# Patient Record
Sex: FEMALE | Race: BLACK OR AFRICAN AMERICAN | Employment: UNEMPLOYED | URBAN - METROPOLITAN AREA
[De-identification: names, ages, dates, MRNs, and addresses within clinical notes are randomized per-mention and may not be internally consistent; named-entity substitution may affect disease eponyms.]

---
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
## 2021-07-16 ENCOUNTER — APPOINTMENT (OUTPATIENT)
Dept: GENERAL RADIOLOGY | Age: 21
End: 2021-07-16
Attending: EMERGENCY MEDICINE
Payer: MEDICAID

## 2021-07-16 ENCOUNTER — HOSPITAL ENCOUNTER (EMERGENCY)
Age: 21
Discharge: HOME | End: 2021-07-16
Attending: EMERGENCY MEDICINE
Payer: MEDICAID

## 2021-07-16 VITALS
TEMPERATURE: 98.7 F | HEART RATE: 87 BPM | SYSTOLIC BLOOD PRESSURE: 113 MMHG | OXYGEN SATURATION: 100 % | DIASTOLIC BLOOD PRESSURE: 63 MMHG | RESPIRATION RATE: 18 BRPM

## 2021-07-16 DIAGNOSIS — Z20.822 SUSPECTED COVID-19 VIRUS INFECTION: ICD-10-CM

## 2021-07-16 DIAGNOSIS — R05.9 COUGH: Primary | ICD-10-CM

## 2021-07-16 DIAGNOSIS — Z20.822 CLOSE EXPOSURE TO COVID-19 VIRUS: ICD-10-CM

## 2021-07-16 LAB — SARS-COV-2, COV2: NORMAL

## 2021-07-16 PROCEDURE — 99283 EMERGENCY DEPT VISIT LOW MDM: CPT

## 2021-07-16 PROCEDURE — 74011250637 HC RX REV CODE- 250/637: Performed by: EMERGENCY MEDICINE

## 2021-07-16 PROCEDURE — U0003 INFECTIOUS AGENT DETECTION BY NUCLEIC ACID (DNA OR RNA); SEVERE ACUTE RESPIRATORY SYNDROME CORONAVIRUS 2 (SARS-COV-2) (CORONAVIRUS DISEASE [COVID-19]), AMPLIFIED PROBE TECHNIQUE, MAKING USE OF HIGH THROUGHPUT TECHNOLOGIES AS DESCRIBED BY CMS-2020-01-R: HCPCS

## 2021-07-16 PROCEDURE — 71045 X-RAY EXAM CHEST 1 VIEW: CPT

## 2021-07-16 RX ORDER — ONDANSETRON 4 MG/1
4 TABLET, FILM COATED ORAL
Qty: 10 TABLET | Refills: 0 | Status: SHIPPED | OUTPATIENT
Start: 2021-07-16

## 2021-07-16 RX ORDER — ALBUTEROL SULFATE 90 UG/1
2 AEROSOL, METERED RESPIRATORY (INHALATION)
Qty: 1 INHALER | Refills: 0 | Status: SHIPPED | OUTPATIENT
Start: 2021-07-16

## 2021-07-16 RX ORDER — ACETAMINOPHEN 325 MG/1
650 TABLET ORAL
Qty: 30 TABLET | Refills: 0 | Status: SHIPPED | OUTPATIENT
Start: 2021-07-16

## 2021-07-16 RX ORDER — GUAIFENESIN 600 MG/1
600 TABLET, EXTENDED RELEASE ORAL
Qty: 20 TABLET | Refills: 0 | Status: SHIPPED | OUTPATIENT
Start: 2021-07-16

## 2021-07-16 RX ORDER — ACETAMINOPHEN 500 MG
500 TABLET ORAL
Status: COMPLETED | OUTPATIENT
Start: 2021-07-16 | End: 2021-07-16

## 2021-07-16 RX ADMIN — ACETAMINOPHEN 500 MG: 500 TABLET ORAL at 09:35

## 2021-07-16 NOTE — Clinical Note
[address] [facility] EMERGENCY DEPT  [address] [address] [phone]    Work/School Note    Date: 7/16/2021     To Whom It May concern:    Beverly Enciso was evaulated by the following provider(s):  Attending Provider: Nat Lopes, [address] virus is suspected. Per the CDC guidelines we recommend home isolation until the following conditions are all met:    1. At least 10 days have passed since symptoms first appeared and  2. At least 24 hours have passed since last fever without the use of fever-reducing medications and  3.  Symptoms (e.g., cough, shortness of breath) have improved    Sincerely,          Flora Hernandez,

## 2021-07-16 NOTE — ED TRIAGE NOTES
Patient states she has been having a cough and nasal congestion. She states she has been exposed to covid because her dad recently tested positive.

## 2021-07-16 NOTE — LETTER
NOTIFICATION RETURN TO WORK / SCHOOL    7/16/2021 10:54 AM    Ms. Jordi Kirby  [facility] [address]      To Whom It May Concern:    Jordi Kirby is currently under the care of [id number] [place] [address] EMERGENCY DEPT. She will return to work/school on: July 30, 2021 unless determined otherwise by her primary care provider    If there are questions or concerns please have the patient contact our office.         Sincerely,      Dr. Bret Lema

## 2021-07-16 NOTE — DISCHARGE INSTRUCTIONS
You are to self quarantine for 14 days. If you were prescribed any medication take as directed. Do not drive or use heavy equipment if prescribed narcotics. Follow up with your primary care physician or with specialist as directed. Return to the emergency room with any new or worsening conditions.

## 2021-07-16 NOTE — ED PROVIDER NOTES
EMERGENCY DEPARTMENT HISTORY AND PHYSICAL EXAM    9:10 AM      Date: 7/16/2021  Patient Name: Barbara Davis    History of Presenting Illness     Chief Complaint   Patient presents with    Nasal Congestion    Cough         History Provided By: Patient    Additional History (Context): Barbara Davis is a 24 y.o. female with Past medical history of UTI who presents with chief complaint of cough and congestion for the past 2 days. She reports that her father has [phone] and she was exposed. She admits that she has not had a COVID-19 vaccination. Associated symptoms are body aches, fever, and seems to be losing her sense of taste. She denies any vomiting but does have some nausea. No chest pain, abdominal pain, diarrhea, dysuria, flank pain, and no other complaints. PCP: Clare Wu NP        Past History     Past Medical History:  Past Medical History:   Diagnosis Date    Bacterial vaginitis     Head injury 06/2018    UTI (urinary tract infection)        Past Surgical History:  No past surgical history on file. Family History:  History reviewed. No pertinent family history. Social History:  Social History     Tobacco Use    Smoking status: Never Smoker    Smokeless tobacco: Never Used   Substance Use Topics    Alcohol use: No    Drug use: No       Allergies:  No Known Allergies      Review of Systems       Review of Systems   Constitutional: Positive for chills and fever. Losing the sensation of her taste   HENT: Positive for congestion. Negative for rhinorrhea, sore throat and trouble swallowing. Eyes: Negative for visual disturbance. Respiratory: Positive for cough. Negative for shortness of breath and wheezing. Cardiovascular: Negative for chest pain and palpitations. Gastrointestinal: Negative for abdominal pain, nausea and vomiting. Endocrine: Negative for polyuria. Genitourinary: Negative for dysuria and flank pain.    Musculoskeletal: Positive for myalgias. Negative for arthralgias and neck stiffness. Skin: Negative for pallor and rash. Neurological: Negative for dizziness, weakness, numbness and headaches. Hematological: Does not bruise/bleed easily. Psychiatric/Behavioral: Negative for confusion and dysphoric mood. All other systems reviewed and are negative. Physical Exam     Visit Vitals  /70 (BP 1 Location: Left upper arm)   Pulse 87   Temp 100.1 °F (37.8 °C)   Resp 18   SpO2 99%         Physical Exam  Vitals and nursing note reviewed. Constitutional:       General: She is not in acute distress. Appearance: She is well-developed. She is not toxic-appearing or diaphoretic. HENT:      Head: Normocephalic and atraumatic. Eyes:      General: No scleral icterus. Conjunctiva/sclera: Conjunctivae normal.      Pupils: Pupils are equal, round, and reactive to light. Cardiovascular:      Rate and Rhythm: Normal rate. Pulses: Normal pulses. Heart sounds: Normal heart sounds. Comments: Capillary refill < 3 seconds  Pulmonary:      Effort: Pulmonary effort is normal. No respiratory distress. Breath sounds: Normal breath sounds. No wheezing. Abdominal:      General: Abdomen is flat. There is no distension. Musculoskeletal:         General: Normal range of motion. Cervical back: Normal range of motion and neck supple. No rigidity. Lymphadenopathy:      Cervical: No cervical adenopathy. Skin:     General: Skin is warm and dry. Coloration: Skin is not jaundiced or pale. Neurological:      Mental Status: She is alert and oriented to person, place, and time. Cranial Nerves: No cranial nerve deficit. Sensory: No sensory deficit. Motor: No weakness. Coordination: Coordination normal.   Psychiatric:         Mood and Affect: Mood normal.         Thought Content:  Thought content normal.           Diagnostic Study Results     Labs -  Recent Results (from the past 12 hour(s)) SARS-COV-2    Collection Time: 07/16/21  9:15 AM   Result Value Ref Range    SARS-CoV-2 Please find results under separate order         Radiologic Studies -   XR CHEST PORT   Final Result   No definite acute findings. If symptoms persist recommend CT. Medical Decision Making   I am the first provider for this patient. I reviewed the vital signs, available nursing notes, past medical history, past surgical history, family history and social history. Vital Signs-Reviewed the patient's vital signs. Pulse Oximetry Analysis -  99% on room air (Interpretation) normal        Records Reviewed: Nursing Notes and Old Medical Records (Time of Review: 10:36 AM)    Provider Notes (Medical Decision Making): DDx: COVID-19, pneumonia, URI    Patient was exposed to COVID-19 infection from her father. She is now with cough and fatigue and low-grade fever    We will do Covid swab, get chest x-ray, give Tylenol    I employed PPE during examination    MDM    Medications   acetaminophen (TYLENOL) tablet 500 mg (500 mg Oral Given 7/16/21 0935)           ED Course: Progress Notes, Reevaluation, and Consults:  Patient is to self quarantine for 14 days    Nontoxic-appearing    No acute on chest x-ray    She will contact her PCP today for follow-up    I have reassessed the patient. I have discussed the workup, results and plan with the patient and patient is in agreement. Patient has no new complaints. Patient will be prescribed Tylenol, Zofran, Mucinex, albuterol inhaler. Patient was discharge in stable condition. Patient was given outpatient follow up. Patient is to return to emergency department if any new or worsening condition. Diagnosis     Clinical Impression:   1. Cough    2. Close exposure to COVID-19 virus    3.  Suspected COVID-19 virus infection        Disposition: Discharged    Follow-up Information     Follow up With Specialties Details Why Jackson Childress MD Pediatric Medicine In 2 days  [address] [facility] [address] [address]  [phone]             Patient's Medications   Start Taking    ACETAMINOPHEN (TYLENOL) 325 MG TABLET    Take 2 Tablets by mouth every four (4) hours as needed for Pain or Fever. ALBUTEROL (PROVENTIL HFA, VENTOLIN HFA, PROAIR HFA) 90 MCG/ACTUATION INHALER    Take 2 Puffs by inhalation every six (6) hours as needed for Wheezing or Shortness of Breath. Indications: bronchospasm prevention    GUAIFENESIN ER (MUCINEX) 600 MG ER TABLET    Take 1 Tablet by mouth two (2) times daily as needed for Congestion. Indications: cold symptoms    ONDANSETRON HCL (ZOFRAN) 4 MG TABLET    Take 1 Tablet by mouth every eight (8) hours as needed for Nausea or Nausea or Vomiting. Continue Taking    No medications on file   These Medications have changed    No medications on file   Stop Taking    No medications on file         Prashanth Rudd DO    Dragon medical dictation software was used for portions of this report. Unintended transcription errors may occur. My signature above authenticates this document and my orders, the final    diagnosis (es), discharge prescription (s), and instructions in the Epic    record.

## 2021-07-17 ENCOUNTER — PATIENT OUTREACH (OUTPATIENT)
Dept: CASE MANAGEMENT | Age: 21
End: 2021-07-17

## 2021-07-17 LAB — SARS-COV-2, COV2NT: DETECTED

## 2021-07-17 NOTE — PROGRESS NOTES
Patient contacted regarding [id number] Suspect. Discussed COVID-19 related testing which was performed an additional time at [facility] after ED visit. Rapid Test results were positive. Patient informed of results, if available? Yes, per [facility] . Outreach made within 2 business days of discharge: Matteo  Coordinator contacted the patient by telephone to perform post discharge assessment. Verified name and  with patient as identifiers. Provided introduction to self, and explanation of LPN Care Coordinator role, and reason for call due to risk factors for infection and/or exposure to COVID-19. Symptoms reviewed with patient who verbalized the following symptoms: loss or taste or smell, nausea, no new symptoms, no worsening symptoms and nasal congestion. Denies SOB, chest pain, cough, chills, fever, body aches, wheezing, lightheadness/dizziness,HA, v/d at this time. Due to no new or worsening symptoms encounter was not routed to provider for escalation. Discussed follow-up appointments. If no appointment was previously scheduled, appointment scheduling offered: yes  [address] follow up appointment(s): No future appointments. Non-[facility] follow up appointment(s): n/a    Patient encouraged to make an appointment with PCP for f/up. Advised to return to ED if symptoms worsen or fail to improve. Patients acknowledges understanding. Advance Care Planning:   Does patient have an Advance Directive: currently not on file; education provided      Patient has following risk factors of: COVID-19 Positive . LPN reviewed discharge instructions, medical action plan and red flags such as increased shortness of breath, increasing fever and signs of decompensation with patient who verbalized understanding. Discussed exposure protocols and quarantine with CDC Guidelines What to do if you are sick with coronavirus disease .  Patient was given an opportunity for questions and concerns.  The patient agrees to contact the [facility] exposure line [phone], local [facility] department [address]  ([phone]} and PCP office for questions related to their healthcare. LPN provided contact information for future needs. Reviewed and educated patient on any new and changed medications related to discharge diagnosis. Plan for follow-up call in 7-14 days based on severity of symptoms and risk factors.

## 2021-08-02 ENCOUNTER — PATIENT OUTREACH (OUTPATIENT)
Dept: CASE MANAGEMENT | Age: 21
End: 2021-08-02

## 2021-08-02 NOTE — PROGRESS NOTES
Patient resolved from Transition of Care episode on Date/Time:  8/2/2021 2:49 PM  .   LPN Care Coordinator was unsuccessful at contacting this patient today. Patient/family was provided the following resources and education related to COVID-19 during the initial call:                         Signs, symptoms and red flags related to COVID-19            CDC exposure and quarantine guidelines            [facility] exposure contact - [phone]            Contact for their local Department of Health                 Patient has not had any additional ED or hospital visits. No further outreach scheduled with this LPN Care Coordinator. Patient has this LPN Care Coordinator contact information if future needs arise. Episode of Care resolved.

## 2022-09-10 ENCOUNTER — APPOINTMENT (OUTPATIENT)
Dept: GENERAL RADIOLOGY | Age: 22
End: 2022-09-10
Attending: EMERGENCY MEDICINE
Payer: MEDICAID

## 2022-09-10 ENCOUNTER — HOSPITAL ENCOUNTER (EMERGENCY)
Age: 22
Discharge: HOME | End: 2022-09-10
Attending: EMERGENCY MEDICINE
Payer: MEDICAID

## 2022-09-10 VITALS
DIASTOLIC BLOOD PRESSURE: 71 MMHG | TEMPERATURE: 98.1 F | SYSTOLIC BLOOD PRESSURE: 102 MMHG | RESPIRATION RATE: 16 BRPM | WEIGHT: 140 LBS | HEIGHT: 63 IN | HEART RATE: 84 BPM | OXYGEN SATURATION: 100 % | BODY MASS INDEX: 24.8 KG/M2

## 2022-09-10 DIAGNOSIS — S63.501A WRIST SPRAIN, RIGHT, INITIAL ENCOUNTER: Primary | ICD-10-CM

## 2022-09-10 LAB — HCG UR QL: NEGATIVE

## 2022-09-10 PROCEDURE — 96372 THER/PROPH/DIAG INJ SC/IM: CPT

## 2022-09-10 PROCEDURE — 81025 URINE PREGNANCY TEST: CPT

## 2022-09-10 PROCEDURE — 99284 EMERGENCY DEPT VISIT MOD MDM: CPT

## 2022-09-10 PROCEDURE — 73110 X-RAY EXAM OF WRIST: CPT

## 2022-09-10 PROCEDURE — 74011250636 HC RX REV CODE- 250/636: Performed by: EMERGENCY MEDICINE

## 2022-09-10 RX ORDER — IBUPROFEN 600 MG/1
600 TABLET ORAL
Qty: 20 TABLET | Refills: 0 | Status: SHIPPED | OUTPATIENT
Start: 2022-09-10

## 2022-09-10 RX ORDER — KETOROLAC TROMETHAMINE 15 MG/ML
15 INJECTION, SOLUTION INTRAMUSCULAR; INTRAVENOUS
Status: COMPLETED | OUTPATIENT
Start: 2022-09-10 | End: 2022-09-10

## 2022-09-10 RX ADMIN — KETOROLAC TROMETHAMINE 15 MG: 15 INJECTION, SOLUTION INTRAMUSCULAR; INTRAVENOUS at 13:26

## 2022-09-10 NOTE — ED TRIAGE NOTES
Pt c/o right wrist pain since this morning. Pt reports taking a pole dancing class and possibly injured wrist while doing dance movements. Sensation present, Cap refill less than 3 seconds, no obvious deformity noted.

## 2022-09-10 NOTE — Clinical Note
[address] [facility] EMERGENCY DEPT  [address] [address] [phone]    Work/School Note    Date: 9/10/2022    To Whom It May concern:      Roista Pérez was seen and treated today in the emergency room by the following provider(s):  Attending Provider: Kamari Ledbetter MD.      Rosita Pérez is excused from work/school on 09/10/22. She is clear to return to work/school on 09/11/22.         Sincerely,          Manuel Lr MD

## 2022-09-27 NOTE — ED PROVIDER NOTES
EMERGENCY DEPARTMENT HISTORY AND PHYSICAL EXAM    7:34 AM      Date: 9/10/2022  Patient Name: Leon Huber    History of Presenting Illness     Chief Complaint   Patient presents with    Wrist Pain         History Provided By: Patient  Location/Duration/Severity/Modifying factors   The patient is a 77-year-old female with a history of head injury that presents emergency department with complaint of right wrist pain that was noted this morning. Patient says she was out with her friends and they took a class about how to pull dance and that she was moving her wrist and ways she had had to do before and was drinking. Patient said that she knew it was strenuous but this morning woke up and had a hard time moving her right wrist.  Patient denies any numbness or tingling. Patient denies any fall onto her wrist.  Patient was concerned that she could have broken her wrist and wanted to be evaluated. Patient denies being a smoker, occasionally drinks alcohol, denies any drug use. PCP: Davey Gutierres, DO    Current Outpatient Medications   Medication Sig Dispense Refill    ibuprofen (MOTRIN) 600 mg tablet Take 1 Tablet by mouth every six (6) hours as needed for Pain. 20 Tablet 0    acetaminophen (TYLENOL) 325 mg tablet Take 2 Tablets by mouth every four (4) hours as needed for Pain or Fever. (Patient not taking: Reported on 9/10/2022) 30 Tablet 0    ondansetron hcl (Zofran) 4 mg tablet Take 1 Tablet by mouth every eight (8) hours as needed for Nausea or Nausea or Vomiting. (Patient not taking: Reported on 9/10/2022) 10 Tablet 0    albuterol (PROVENTIL HFA, VENTOLIN HFA, PROAIR HFA) 90 mcg/actuation inhaler Take 2 Puffs by inhalation every six (6) hours as needed for Wheezing or Shortness of Breath. Indications: bronchospasm prevention (Patient not taking: Reported on 9/10/2022) 1 Inhaler 0    guaiFENesin ER (Mucinex) 600 mg ER tablet Take 1 Tablet by mouth two (2) times daily as needed for Congestion. Indications: cold symptoms (Patient not taking: Reported on 9/10/2022) 20 Tablet 0       Past History     Past Medical History:  Past Medical History:   Diagnosis Date    Bacterial vaginitis     Head injury 06/2018    UTI (urinary tract infection)        Past Surgical History:  No past surgical history on file. Family History:  No family history on file. Social History:  Social History     Tobacco Use    Smoking status: Never    Smokeless tobacco: Never   Substance Use Topics    Alcohol use: No    Drug use: No       Allergies:  No Known Allergies      Review of Systems       Review of Systems   Constitutional:  Negative for activity change, fatigue and fever. HENT:  Negative for congestion and rhinorrhea. Eyes:  Negative for visual disturbance. Respiratory:  Negative for shortness of breath. Cardiovascular:  Negative for chest pain and palpitations. Gastrointestinal:  Negative for abdominal pain, diarrhea, nausea and vomiting. Genitourinary:  Negative for dysuria and hematuria. Musculoskeletal:  Positive for arthralgias and myalgias. Negative for back pain. Skin:  Negative for rash. Neurological:  Negative for dizziness, weakness and light-headedness. All other systems reviewed and are negative. Physical Exam   Visit Vitals  /71 (BP 1 Location: Left upper arm)   Pulse 84   Temp 98.1 °F (36.7 °C)   Resp 16   Ht 5' 3\" (1.6 m)   Wt 63.5 kg (140 lb)   LMP  (LMP Unknown) Comment: pt has been on depo and has not had injection since june 2022   SpO2 100%   BMI 24.80 kg/m²         Physical Exam  Vitals and nursing note reviewed. Constitutional:       General: She is not in acute distress. Appearance: She is well-developed. HENT:      Head: Normocephalic and atraumatic. Right Ear: External ear normal.      Left Ear: External ear normal.      Nose: Nose normal.   Eyes:      General: No scleral icterus.      Conjunctiva/sclera: Conjunctivae normal.      Pupils: Pupils are equal, round, and reactive to light. Neck:      Thyroid: No thyromegaly. Vascular: No JVD. Trachea: No tracheal deviation. Cardiovascular:      Rate and Rhythm: Normal rate and regular rhythm. Heart sounds: Normal heart sounds. No murmur heard. No friction rub. No gallop. Pulmonary:      Effort: Pulmonary effort is normal.      Breath sounds: Normal breath sounds. Chest:      Chest wall: No tenderness. Abdominal:      General: Bowel sounds are normal. There is no distension. Palpations: Abdomen is soft. Tenderness: There is no abdominal tenderness. There is no guarding or rebound. Musculoskeletal:         General: No tenderness. Normal range of motion. Cervical back: Normal range of motion and neck supple. Comments: Right wrist has full passive and active range of motion, pain noted along forearm especially with flexion at the wrist, distal pulses and sensation are intact, full strength in the hand, no deformity or discoloration   Lymphadenopathy:      Cervical: No cervical adenopathy. Skin:     General: Skin is warm and dry. Neurological:      Mental Status: She is alert and oriented to person, place, and time. Cranial Nerves: No cranial nerve deficit. Coordination: Coordination normal.      Comments: No sensory loss, Gait normal, Motor 5/5   Psychiatric:         Behavior: Behavior normal.         Thought Content: Thought content normal.         Judgment: Judgment normal.         Diagnostic Study Results     Labs -  No results found for this or any previous visit (from the past 12 hour(s)). Radiologic Studies -   XR WRIST RT AP/LAT/OBL MIN 3V   Final Result      Negative for acute bony injury. Thank you for your referral.             Medical Decision Making   I am the first provider for this patient. I reviewed the vital signs, available nursing notes, past medical history, past surgical history, family history and social history.     Vital Signs-Reviewed the patient's vital signs. Records Reviewed: Nursing Notes, Old Medical Records, Previous Radiology Studies, and Previous Laboratory Studies (Time of Review: 7:34 AM)    ED Course: Progress Notes, Reevaluation, and Consults: The discharge instructions were reviewed with the patient and the patient verbalized understanding. The patient had no questions about the discharge instructions. The patient will follow closely with the outpatient care plan and will return if at all worsened or concerned. Shreyas Milan, DO 7:39 AM      Provider Notes (Medical Decision Making):   MDM  Number of Diagnoses or Management Options  Wrist sprain, right, initial encounter  Diagnosis management comments: Patient is a 80-year-old female with a history of head injury in the remote past that presents emergency department with right wrist pain after taking a pole dancing class. Patient has a reassuring x-ray in full range of motion on her exam and suspect this is of a sprain likely from the forces placed on the joint during her last she took. We will place her in a splint and have her follow closely as an outpatient with Ortho and return if at all worsened or concerned. Procedures          Diagnosis     Clinical Impression:   1.  Wrist sprain, right, initial encounter        Disposition: DC    Follow-up Information       Follow up With Specialties Details Why [address] [facility] Surgery In 2 days  [address] [id number] [phone] [address] [facility] EMERGENCY DEPT Emergency Medicine  As needed, If symptoms worsen [address] [facility]  [phone]             Discharge Medication List as of 9/10/2022  1:31 PM        START taking these medications    Details   ibuprofen (MOTRIN) 600 mg tablet Take 1 Tablet by mouth every six (6) hours as needed for Pain., Normal, Disp-20 Tablet, R-0 CONTINUE these medications which have NOT CHANGED    Details   acetaminophen (TYLENOL) 325 mg tablet Take 2 Tablets by mouth every four (4) hours as needed for Pain or Fever., Normal, Disp-30 Tablet, R-0      ondansetron hcl (Zofran) 4 mg tablet Take 1 Tablet by mouth every eight (8) hours as needed for Nausea or Nausea or Vomiting., Normal, Disp-10 Tablet, R-0      albuterol (PROVENTIL HFA, VENTOLIN HFA, PROAIR HFA) 90 mcg/actuation inhaler Take 2 Puffs by inhalation every six (6) hours as needed for Wheezing or Shortness of Breath. Indications: bronchospasm prevention, Normal, Disp-1 Inhaler, R-0      guaiFENesin ER (Mucinex) 600 mg ER tablet Take 1 Tablet by mouth two (2) times daily as needed for Congestion. Indications: cold symptoms, Normal, Disp-20 Tablet, R-0           Disclaimer: Sections of this note are dictated using utilizing voice recognition software. Minor typographical errors may be present. If questions arise, please do not hesitate to contact me or call our department.

## 2023-09-25 ENCOUNTER — HOSPITAL ENCOUNTER (EMERGENCY)
Facility: HOSPITAL | Age: 23
Discharge: HOME | End: 2023-09-25
Attending: STUDENT IN AN ORGANIZED HEALTH CARE EDUCATION/TRAINING PROGRAM

## 2023-09-25 VITALS
SYSTOLIC BLOOD PRESSURE: 102 MMHG | WEIGHT: 130 LBS | TEMPERATURE: 98.8 F | BODY MASS INDEX: 23.04 KG/M2 | HEART RATE: 94 BPM | OXYGEN SATURATION: 98 % | DIASTOLIC BLOOD PRESSURE: 43 MMHG | HEIGHT: 63 IN | RESPIRATION RATE: 20 BRPM

## 2023-09-25 DIAGNOSIS — J06.9 VIRAL URI: Primary | ICD-10-CM

## 2023-09-25 LAB — HCG UR QL: NEGATIVE

## 2023-09-25 PROCEDURE — 81025 URINE PREGNANCY TEST: CPT

## 2023-09-25 PROCEDURE — 99283 EMERGENCY DEPT VISIT LOW MDM: CPT

## 2023-09-25 ASSESSMENT — LIFESTYLE VARIABLES
HOW OFTEN DO YOU HAVE A DRINK CONTAINING ALCOHOL: MONTHLY OR LESS
HOW MANY STANDARD DRINKS CONTAINING ALCOHOL DO YOU HAVE ON A TYPICAL DAY: PATIENT DOES NOT DRINK

## 2023-09-25 ASSESSMENT — PAIN - FUNCTIONAL ASSESSMENT: PAIN_FUNCTIONAL_ASSESSMENT: 0-10

## 2023-09-25 ASSESSMENT — PAIN SCALES - GENERAL: PAINLEVEL_OUTOF10: 6

## 2023-09-25 NOTE — ED TRIAGE NOTES
Pt c/o headache, body aches, congestion for 2 days. OTC meds not helping.  Did not take a home COVID test

## 2023-09-25 NOTE — DISCHARGE INSTRUCTIONS
Recommend taking extra strength Tylenol every 4-6 hours and ibuprofen 600 mg every 6 hours as needed for pain and discomfort. Would recommend having a humidifier at your bedside to help keep secretions loose. Sinus rinses and Afrin nasal spray will work better for your congestion and over-the-counter medications.

## 2024-11-25 ENCOUNTER — HOSPITAL ENCOUNTER (EMERGENCY)
Facility: HOSPITAL | Age: 24
Discharge: HOME | End: 2024-11-25
Attending: EMERGENCY MEDICINE
Payer: COMMERCIAL

## 2024-11-25 ENCOUNTER — APPOINTMENT (OUTPATIENT)
Facility: HOSPITAL | Age: 24
End: 2024-11-25
Attending: EMERGENCY MEDICINE
Payer: COMMERCIAL

## 2024-11-25 VITALS
TEMPERATURE: 98 F | WEIGHT: 160 LBS | OXYGEN SATURATION: 100 % | SYSTOLIC BLOOD PRESSURE: 120 MMHG | BODY MASS INDEX: 28.35 KG/M2 | DIASTOLIC BLOOD PRESSURE: 78 MMHG | HEIGHT: 63 IN | HEART RATE: 70 BPM | RESPIRATION RATE: 16 BRPM

## 2024-11-25 DIAGNOSIS — S86.911A STRAIN OF RIGHT KNEE, INITIAL ENCOUNTER: ICD-10-CM

## 2024-11-25 DIAGNOSIS — M25.561 ACUTE PAIN OF RIGHT KNEE: Primary | ICD-10-CM

## 2024-11-25 PROCEDURE — 73562 X-RAY EXAM OF KNEE 3: CPT

## 2024-11-25 PROCEDURE — 99283 EMERGENCY DEPT VISIT LOW MDM: CPT

## 2024-11-25 RX ORDER — IBUPROFEN 600 MG/1
600 TABLET, FILM COATED ORAL EVERY 6 HOURS PRN
Qty: 30 TABLET | Refills: 0 | Status: SHIPPED | OUTPATIENT
Start: 2024-11-25

## 2024-11-25 ASSESSMENT — LIFESTYLE VARIABLES
HOW OFTEN DO YOU HAVE A DRINK CONTAINING ALCOHOL: NEVER
HOW MANY STANDARD DRINKS CONTAINING ALCOHOL DO YOU HAVE ON A TYPICAL DAY: PATIENT DOES NOT DRINK

## 2024-11-25 ASSESSMENT — PAIN DESCRIPTION - LOCATION: LOCATION: KNEE

## 2024-11-25 ASSESSMENT — PAIN DESCRIPTION - FREQUENCY: FREQUENCY: CONTINUOUS

## 2024-11-25 ASSESSMENT — PAIN - FUNCTIONAL ASSESSMENT
PAIN_FUNCTIONAL_ASSESSMENT: 0-10
PAIN_FUNCTIONAL_ASSESSMENT: 0-10
PAIN_FUNCTIONAL_ASSESSMENT: PREVENTS OR INTERFERES SOME ACTIVE ACTIVITIES AND ADLS

## 2024-11-25 ASSESSMENT — PAIN SCALES - GENERAL
PAINLEVEL_OUTOF10: 8
PAINLEVEL_OUTOF10: 4

## 2024-11-25 ASSESSMENT — PAIN DESCRIPTION - PAIN TYPE: TYPE: ACUTE PAIN

## 2024-11-25 ASSESSMENT — PAIN DESCRIPTION - ONSET: ONSET: GRADUAL

## 2024-11-25 ASSESSMENT — PAIN DESCRIPTION - ORIENTATION: ORIENTATION: RIGHT

## 2024-11-25 ASSESSMENT — PAIN DESCRIPTION - DESCRIPTORS: DESCRIPTORS: ACHING

## 2024-11-25 NOTE — ED PROVIDER NOTES
Personally, on initial evaluation (Time of Review: 5:38 PM)      ED Course: Progress Notes, Reevaluation, and Consults:  24-year-old female presents to the emergency department with complaints of right knee pain.  States yesterday she was walking she kind of twisted her knee and then fell directly onto it.        DDX includes but is not limited to:  sprain, fracture, dislocation, contusion     Patient is alert and oriented.  Does not appear to be in acute distress. Nontoxic in appearance. Afebrile.      Will obtain lab work and imaging for further evaluation of patients complaint. Will continue to monitor and evaluate patient while in the ED.    Orders as below:  Orders Placed This Encounter   Procedures    XR KNEE RIGHT (3 VIEWS)    Apply ace wrap      Xray shows no fractures or dislocations as interpreted by me.     MEDICATIONS ADMINISTERED IN THE ED:  Medications - No data to display          Pt has been reexamined. Patient has no new complaints, or physical findings. Care plan outlined and precautions discussed.  Results were reviewed with the patient. All medications were reviewed with the patient. All of pt's questions and concerns were addressed.  Alarm symptoms and return precautions associated with chief complaint and evaluation were reviewed with the patient in detail.  The patient demonstrated adequate understanding.  Patient was instructed to follow up with PCP in 2 days for reassessment, as well as given strict return precautions to the ED upon further deterioration. Patient is ready for discharge home.    Diagnosis     Clinical Impression:   1. Acute pain of right knee    2. Strain of right knee, initial encounter        Disposition: home      [facility] EMERGENCY DEPT  [address] [address] [phone]    If symptoms worsen    Frankel, Julia,   [address]  [address]  [facility] [address] [phone]    Schedule an appointment as soon as possible for a visit in 2 days

## 2024-11-25 NOTE — ED TRIAGE NOTES
Ambulatory to QA with limp, states twisted right knee yesterday and then fell. Pain and swelling right knee.